# Patient Record
Sex: MALE | Race: WHITE | NOT HISPANIC OR LATINO | Employment: STUDENT | URBAN - METROPOLITAN AREA
[De-identification: names, ages, dates, MRNs, and addresses within clinical notes are randomized per-mention and may not be internally consistent; named-entity substitution may affect disease eponyms.]

---
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
## 2023-01-06 NOTE — ED PROVIDER NOTES
History  Chief Complaint   Patient presents with   • Shoulder Injury     Pt arrives EMS after possible shoulder dislocation while skiing, pt denies fall on shoulder or head strike but states it "popped out" while making a jump  Pt has hx of the same  Received tylenol en route  Shoulder in sling [place] staff [place]       21 y o   male  Patient presents with:  Shoulder Injury: Pt arrives EMS after possible shoulder dislocation while skiing, pt denies fall on shoulder or head strike but states it "popped out" while making a jump  Pt has hx of the same  Received tylenol en route  [facility]  No past medical history on file  Active Ambulatory Problems  No Active Ambulatory Problems  Resolved Ambulatory Problems  No Resolved Ambulatory Problems  No Additional Past Medical History     68-year-old male patient presents emergency department for evaluation of left shoulder dislocation  The patient has an open glenoid signs on physical exam   He has dislocated the shoulder in the past, has been able to relocate it but that he could not  Intra-articular injection was done, the patient was given IV pain medications, the patient was walked through the shoulder relocation and had some resolution of his symptoms  Repeat x-ray was indeterminant so a CT scan of the shoulder was done  The patient lives in [place], I spoke with his father who is a primary care physician, he is going to have him follow-up with orthopedic surgery in [place]        History provided by:  Patient   used: No    Shoulder Injury  Location:  Shoulder  Shoulder location:  L shoulder  Injury: no    Pain details:     Quality:  Aching    Radiates to:  Does not radiate    Timing:  Constant  Dislocation: yes    Prior injury to area:  Yes  Relieved by:  Nothing  Worsened by:  Nothing  Ineffective treatments:  None tried  Associated symptoms: no fatigue and no fever        None       No past medical history on file  No past surgical history on file  No family history on file  I have reviewed and agree with the history as documented  No existing history information found  No existing history information found  Review of Systems   Constitutional: Negative for fatigue and fever  All other systems reviewed and are negative  Physical Exam  Physical Exam  Vitals and nursing note reviewed  Constitutional:       General: He is not in acute distress  Appearance: He is well-developed  He is not diaphoretic  HENT:      Head: Normocephalic and atraumatic  Right Ear: External ear normal       Left Ear: External ear normal    Eyes:      General: No scleral icterus  Right eye: No discharge  Left eye: No discharge  Conjunctiva/sclera: Conjunctivae normal    Neck:      Thyroid: No thyromegaly  Vascular: No JVD  Trachea: No tracheal deviation  Cardiovascular:      Rate and Rhythm: Normal rate and regular rhythm  Pulmonary:      Effort: Pulmonary effort is normal  No respiratory distress  Breath sounds: Normal breath sounds  No stridor  No wheezing or rales  Abdominal:      General: Bowel sounds are normal  There is no distension  Palpations: Abdomen is soft  Tenderness: There is no abdominal tenderness  Musculoskeletal:         General: No tenderness or deformity  Normal range of motion  Cervical back: Normal range of motion and neck supple  Skin:     General: Skin is warm and dry  Neurological:      Mental Status: He is alert and oriented to person, place, and time  Cranial Nerves: No cranial nerve deficit        Coordination: Coordination normal    Psychiatric:         Behavior: Behavior normal          Vital Signs  ED Triage Vitals   Temperature Pulse Respirations Blood Pressure SpO2   12/30/22 2031 12/30/22 2031 12/30/22 2031 12/30/22 2031 12/30/22 2031   99 1 °F (37 3 °C) (!) 113 20 139/93 97 %      Temp Source Heart Rate Source Patient Position - Orthostatic VS BP Location FiO2 (%)   12/30/22 2031 12/30/22 2031 12/30/22 2215 12/30/22 2031 --   Oral Monitor Sitting Right arm       Pain Score       12/30/22 2043       10 - Worst Possible Pain           Vitals:    12/30/22 2031 12/30/22 2215 12/30/22 2230   BP: 139/93 116/62 117/64   Pulse: (!) 113 94 96   Patient Position - Orthostatic VS:  Sitting Sitting         Visual Acuity      ED Medications  Medications   morphine injection 4 mg (4 mg Intravenous Given 12/30/22 2043)   ondansetron (ZOFRAN) injection 4 mg (4 mg Intravenous Given 12/30/22 2043)   diphenhydrAMINE (BENADRYL) injection 50 mg (50 mg Intravenous Given 12/30/22 2043)   lidocaine-epinephrine (XYLOCAINE/EPINEPHRINE) 1 %-1:100,000 injection 20 mL (20 mL Infiltration Given by Other 12/30/22 2123)   diazepam (VALIUM) 5 mg/mL injection **ADS Override Pull** (5 mg  Given 12/30/22 2122)       Diagnostic Studies  Results Reviewed     None                 CT upper extremity wo contrast left   Final Result by Gurmeet Ortiz MD (12/30 7464)      1  Humeral head Hill-Sachs deformity  This could be acute or chronic as patient has had multiple dislocations  2   Foci of air within the subacromion subdeltoid bursa, this is most likely vacuum phenomenon from trauma  Given location within the bursa as opposed to the joint, this may indicate that there is a full-thickness rotator cuff defect  Workstation performed: [id number]         XR shoulder 1 vw left   Final Result by Zeb Hernandez MD (12/31 6275)      No acute osseous abnormality  Workstation performed: [id number]         XR shoulder 1 vw left   Final Result by Zeb Hernandez MD (12/31 5981)      No acute osseous abnormality        Workstation performed: [id number]                    Procedures  Procedures         ED Course  ED Course as of 01/06/23 1157   Fri Dec 30, 2022   2233 I spoke with the father who is a physician and explained why I wanted to CT scan the shoulder  ROM is improved but the joint is significantly lax                                               Medical Decision Making  Shoulder dislocation: complicated acute illness or injury  Amount and/or Complexity of Data Reviewed  Radiology: ordered  Risk  Prescription drug management  Disposition  Final diagnoses:   Shoulder dislocation     Time reflects when diagnosis was documented in both MDM as applicable and the Disposition within this note     Time User Action Codes Description Comment    12/30/2022 11:28 PM Valeria Duncan Add [[id number]] Shoulder dislocation       ED Disposition     ED Disposition   Discharge    Condition   Stable    Date/Time   Fri Dec 30, 2022 11:28 PM    Comment   [facility] discharge to home/self care  Follow-up Information     Follow up With Specialties Details Why Contact Info Additional Information    [address] [facility] Emergency Department Emergency Medicine   [address] [facility] [id number] [facility] Emergency Department, [address] [place], [place], [facility]          There are no discharge medications for this patient  No discharge procedures on file      PDMP Review     None          ED Provider  Electronically Signed by           Rocky Mckeon,   01/06/23 3484